# Patient Record
Sex: FEMALE | Race: WHITE | NOT HISPANIC OR LATINO | Employment: UNEMPLOYED | URBAN - METROPOLITAN AREA
[De-identification: names, ages, dates, MRNs, and addresses within clinical notes are randomized per-mention and may not be internally consistent; named-entity substitution may affect disease eponyms.]

---

## 2018-01-16 ENCOUNTER — TELEPHONE (OUTPATIENT)
Dept: NEUROLOGY | Facility: CLINIC | Age: 48
End: 2018-01-16

## 2018-01-16 NOTE — TELEPHONE ENCOUNTER
----- Message from Na Santana sent at 1/16/2018  8:36 AM CST -----  Contact: pt [phone]  Pt would like to reschedule her follow up appointment.

## 2018-02-02 ENCOUNTER — OFFICE VISIT (OUTPATIENT)
Dept: NEUROLOGY | Facility: CLINIC | Age: 48
End: 2018-02-02
Payer: COMMERCIAL

## 2018-02-02 VITALS
HEART RATE: 72 BPM | SYSTOLIC BLOOD PRESSURE: 128 MMHG | WEIGHT: 146 LBS | BODY MASS INDEX: 29.43 KG/M2 | DIASTOLIC BLOOD PRESSURE: 83 MMHG | HEIGHT: 59 IN

## 2018-02-02 DIAGNOSIS — N31.9 NEUROGENIC BLADDER: ICD-10-CM

## 2018-02-02 DIAGNOSIS — R53.82 CHRONIC FATIGUE: ICD-10-CM

## 2018-02-02 DIAGNOSIS — Z71.89 COUNSELING REGARDING GOALS OF CARE: ICD-10-CM

## 2018-02-02 DIAGNOSIS — G35 MULTIPLE SCLEROSIS: Primary | ICD-10-CM

## 2018-02-02 DIAGNOSIS — G47.9 SLEEP DISTURBANCE: ICD-10-CM

## 2018-02-02 DIAGNOSIS — Z87.2 HISTORY OF PSORIATIC ARTHRITIS: ICD-10-CM

## 2018-02-02 PROCEDURE — 99215 OFFICE O/P EST HI 40 MIN: CPT | Mod: S$GLB,,, | Performed by: PHYSICIAN ASSISTANT

## 2018-02-02 PROCEDURE — 3008F BODY MASS INDEX DOCD: CPT | Mod: S$GLB,,, | Performed by: PHYSICIAN ASSISTANT

## 2018-02-02 PROCEDURE — 99999 PR PBB SHADOW E&M-EST. PATIENT-LVL III: CPT | Mod: PBBFAC,,, | Performed by: PHYSICIAN ASSISTANT

## 2018-02-02 RX ORDER — GLATIRAMER 40 MG/ML
40 INJECTION, SOLUTION SUBCUTANEOUS
Qty: 12 SYRINGE | Refills: 5 | Status: SHIPPED | OUTPATIENT
Start: 2018-02-02 | End: 2018-03-27 | Stop reason: ALTCHOICE

## 2018-02-02 RX ORDER — OXYBUTYNIN CHLORIDE 5 MG/1
5 TABLET, EXTENDED RELEASE ORAL DAILY
Qty: 30 TABLET | Refills: 3 | Status: SHIPPED | OUTPATIENT
Start: 2018-02-02 | End: 2018-08-23

## 2018-02-02 NOTE — PATIENT INSTRUCTIONS
Ask cardiologist is OK to take [id number] for MS related fatigue    Melatonin oral solid dosage forms  What is this medicine?  MELATONIN (lucio padilla) is a dietary supplement. It is mostly promoted to help maintain normal sleep patterns. The FDA has not approved this supplement for any medical use.  This supplement may be used for other purposes; ask your health care provider or pharmacist if you have questions.  How should I use this medicine?  Take this supplement by mouth with a glass of water. Do not take with food. This supplement is usually taken 1 or 2 hours before bedtime. After taking this supplement, limit your activities to those needed to prepare for bed. Some products may be chewed or dissolved in the mouth before swallowing. Some tablets or capsules must be swallowed whole; do not cut, crush or chew. Follow the directions on the package labeling, or take as directed by your health care professional. Do not take this supplement more often than directed.  Talk to your pediatrician regarding the use of this supplement in children. Special care may be needed. This supplement is not recommended for use in children without a prescription.  What side effects may I notice from receiving this medicine?  Side effects that you should report to your doctor or health care professional as soon as possible:  · allergic reactions like skin rash, itching or hives, swelling of the face, lips, or tongue  · breathing problems  · change in sex drive or performance  · confusion  · depressed mood, irritable, or other changes in moods or behaviors  · fast, irregular heartbeat  · feeling faint or lightheaded, falls  · irregular or missed menstrual periods  · leakage of milk from the nipples in a person who is not breast-feeding  · signs and symptoms of liver injury like dark yellow or brown urine; general ill feeling or flu-like symptoms; light-colored stools; loss of appetite; nausea; right upper belly pain; unusually  weak or tired; yellowing of the eyes or skin  · sleep-walking  · trouble staying awake or alert during the day  · unusual activities while you are still asleep like driving, eating, making phone calls  · unusual bleeding or bruising  Side effects that usually do not require medical attention (report to your doctor or health care professional if they continue or are bothersome):  · dizziness  · drowsiness  · headache  · tiredness  · unusual dreams or nightmares  · upset stomach  What may interact with this medicine?  Do not take this medicine with any of the following medications:  · fluvoxamine  · ramelteon  · tasimelteon  This medicine may also interact with the following medications:  · alcohol  · atazanavir  · caffeine  · carbamazepine  · certain antibiotics like ciprofloxacin, enoxacin  · certain medicines for depression, anxiety, or psychotic disturbances  · cimetidine  · female hormones, like estrogens and birth control pills, patches, rings, or injections  · methoxsalen  · nifedipine  · other medications for sleep  · other herbal or dietary supplements  · phenobarbital  · rifampin  · smoking tobacco  · tamoxifen  · treatments for cancer, organ transplant, or immune disorders  What if I miss a dose?  If you miss taking your dose at the usual time, skip that dose. If it is almost time for your next dose, take only that dose. Do not take double or extra doses.  Where should I keep my medicine?  Keep out of the reach of children.  Store at room temperature or as directed on the package label. Protect from moisture. Throw away any unused supplement after the expiration date.  What should I tell my health care provider before I take this medicine?  They need to know if you have any of these conditions:  · asthma  · cancer  · depression or mental illness  · diabetes  · hormone problems  · if you often drink alcohol  · immune system problems  · liver disease  · organ transplant  · seizure disorder  · an unusual or  allergic reaction to melatonin, other medicines, foods, dyes, or preservatives  · pregnant or trying to get pregnant  · breast-feeding  What should I watch for while using this medicine?  See your doctor if your symptoms do not get better or if they get worse. Do not take this supplement for more than 2 weeks unless your doctor tells you to.  You may get drowsy or dizzy. Do not drive, use machinery, or do anything that needs mental alertness until you know how this medicine affects you. Do not stand or sit up quickly, especially if you are an older patient. This reduces the risk of dizzy or fainting spells. Alcohol may interfere with the effect of this medicine. Avoid alcoholic drinks.  Talk to your doctor before you use this supplement if you are currently being treated for an emotional, mental, or sleep problem. This medicine may interfere with your treatment.  Herbal or dietary supplements are not regulated like medicines. Rigid  standards are not required for dietary supplements. The purity and strength of these products can vary. The safety and effect of this dietary supplement for a certain disease or illness is not well known. This product is not intended to diagnose, treat, cure or prevent any disease.  The Food and Drug Administration suggests the following to help consumers protect themselves:  · Always read product labels and follow directions.  · Natural does not mean a product is safe for humans to take.  · Look for products that include USP after the ingredient name. This means that the  followed the standards of the [place] Pharmacopoeia.  · Supplements made or sold by a nationally known food or drug company are more likely to be made under tight controls. You can write to the company for more information about how the product was made.  NOTE:This sheet is a summary. It may not cover all possible information. If you have questions about this medicine, talk to your doctor,  pharmacist, or health care provider. Copyright© 2017 [employer]

## 2018-02-02 NOTE — PROGRESS NOTES
"Subjective:       Patient ID: Barbara Burch is a 48 y.o. female who presents today for a routine clinic visit for MS.  She was last seen in September of 2015 by Dr. Ward. She is returning to clinic to re-establish care    MS HPI:  · DMT: not currently on DMT(stopped Tecfidera after last visit with our clinic)  · Side effects from DMT? Yes - 5,000IU/day "when I remember to take it"  · Taking vitamin D3 as recommended? Yes - non consistent   ·  Tecfidera from April of 2013 to Sept 2015  · Copaxone prior but had  injeciton site reaction(bruising, redness, pain)-daily at that time. Did not have auto injector. Patient can't recall if she had training    SOCIAL HISTORY  Social History   Substance Use Topics    Smoking status: Former Smoker     Types: Cigarettes    Smokeless tobacco: Never Used      Comment: quit march 29th 2015    Alcohol use No     Living arrangements - the patient lives with their family.  Employment  Not working--no disability    MS ROS:  · Fatigue: Yes - mild --fatigued by 11 AM  --takes naps most days  · Sleep Disturbance: Yes - "I don't sleep well"; + insomnia; "mind won't shut off' She tried Ambien, but did not find it helpful;   · Bladder Dysfunction: urgency, has not seen Dr. Linton in [place] (urology);   · Bowel Dysfunction: Yes - a chronic issue for her;   · Spasticity: Yes, in calf on R primarily  · Visual Symptoms: No  · Cognitive: Yes - feels she has more ST memory issues;   · Mood Disorder: No; no longer on the prozac; denies any depression;   · Gait Disturbance: states she doesn't have trouble walking during the day, at night without lights she has trouble  · Falls: No  · Hand Dysfunction: No  · Pain: No  · Sexual Dysfunction: She has a lack of sexual desire;   · Skin Breakdown: No  · Tremors: No  · Dysphagia:  No  · Dysarthria:  No  · Heat sensitivity:  Yes,   · Any un-met adaptive needs? No   Copay Assist? N/A      Objective:        1. 25 foot timed walk: 3.8s and 3.9s today " without assist.  In 2015-timed walk 3.9s without assist      Neurologic Exam     Mental Status   Oriented to person, place, and time.   Follows 3 step commands.   Speech: speech is normal   Level of consciousness: alert  Normal comprehension.     Cranial Nerves     CN II   Visual acuity: normal (20/20 OS and 20/25 OD with Snellen hand held chart at 6 ft)    CN III, IV, VI   Pupils are equal, round, and reactive to light.  Extraocular motions are normal.     CN V   Facial sensation intact.     CN VII   Facial expression full, symmetric.     CN VIII   Hearing: intact (finger rub)    CN IX, X   Palate: symmetric    CN XI   CN XI normal.     CN XII   Tongue deviation: none    Motor Exam   Muscle bulk: normal  Overall muscle tone: normal    Strength   Right deltoid: 5/5  Left deltoid: 5/5  Right triceps: 5/5  Left triceps: 5/5  Right wrist extension: 5/5  Left wrist extension: 5/5  Right interossei: 5/5  Left interossei: 5/5  Right iliopsoas: 5/5  Left iliopsoas: 5/5  Right hamstrin/5  Left hamstrin/5  Right anterior tibial: 5/5  Left anterior tibial: 5/5  Right peroneal: 5/5  Left peroneal: 5/5    Sensory Exam   Light touch normal.   Right arm vibration: normal  Left arm vibration: normal  Right leg vibration: decreased from toes  Left leg vibration: decreased from toes    Gait, Coordination, and Reflexes     Gait  Gait: normal    Coordination   Finger to nose coordination: normal  Tandem walking coordination: abnormal    Tremor   Resting tremor: absent  Action tremor: absent    Reflexes   Right brachioradialis: 2+  Left brachioradialis: 2+  Right biceps: 2+  Left biceps: 2+  Right triceps: 2+  Left triceps: 2+  Right patellar: 2+  Left patellar: 2+  Right achilles: 2+  Left achilles: 2+  Right plantar: equivocal  Left plantar: equivocal  Right ankle clonus: absent  Left ankle clonus: absent  Right pendular knee jerk: absent  Left pendular knee jerk: absentNormal Heel/toe walk  Normal RSM             Imaging:      Results for orders placed during the hospital encounter of 12/15/15   MRI Brain W WO Contrast    Impression Numerous focal areas of T2 FLAIR signal hyperintensity within the supratentorial white matter, similar in distribution to prior exam.  No areas of abnormal enhancement. Compared to the prior examination of 12/19/14, the overall number and signal of the white matter lesions is stable.  Overall, these lesions remain nonspecific, noting that distribution of lesions is not typical of MS.  Correlation with patient's symptoms is advised.    ______________________________________     Electronically signed by resident: LUIS ENRIQUEZ MD  Date:     12/15/15  Time:    10:16          As the supervising and teaching physician, I personally reviewed the images and resident's interpretation and I agree with the findings.          Electronically signed by: CRYSTAL ELIAS MD  Date:     12/15/15  Time:    10:28      Results for orders placed during the hospital encounter of 12/16/13   MRI Cervical Spine W WO Cont    Impression  Unremarkable MRI of the cervical and thoracic spine specifically without definite cord signal abnormality to suggest edema.    No abnormal intrathecal enhancement.    Degenerative changes in the cervical spine most pronounced at C5/C6 with posterior disk osteophyte similar prior with mild central canal stenosis with uncovertebral joint hypertrophy and mild neuroforaminal stenosis.          Electronically signed by: HANNAH HUERTAS DO  Date:     12/17/13  Time:    08:30      Results for orders placed during the hospital encounter of 12/16/13   MRI Thoracic Spine W WO Contrast    Impression  Unremarkable MRI of the cervical and thoracic spine specifically without definite cord signal abnormality to suggest edema.    No abnormal intrathecal enhancement.    Degenerative changes in the cervical spine most pronounced at C5/C6 with posterior disk osteophyte similar prior with mild central canal stenosis with  uncovertebral joint hypertrophy and mild neuroforaminal stenosis.          Electronically signed by: HANNAH HUERTAS DO  Date:     12/17/13  Time:    08:30      Patient brought MRI disc of MRI Brain from October 2017([facility]): no comparison done, No enhancing lesions noted upon review. Mild/moderate bilateral white matter lesions    JENNI-October 2017-Normal  SSEP-October 2017-No significant prolongation of latencies noted    Lumbar X-ray--reveals lumbar spondylosis with bilateral  Facet arthropathy at the lower three lumbar levels    Labs:     Lab Results   Component Value Date    FPNYGBKZ39FR 61 06/03/2015    OSPPNCSG31OZ 82 08/06/2014    FXHCXQKU61HI 22 (L) 08/05/2013     No results found for: JCVINDEX, JCVANTIBODY  No results found for: YD0AUZWU, ABSOLUTECD3, AS1CXJBT, ABSOLUTECD8, EJ8EKAZF, ABSOLUTECD4, LABCD48  Lab Results   Component Value Date    WBC 7.20 10/01/2017    RBC 4.43 10/01/2017    HGB 13.6 10/01/2017    HCT 40.4 10/01/2017    MCV 91 10/01/2017    MCH 30.7 10/01/2017    MCHC 33.7 10/01/2017    RDW 12.5 10/01/2017     10/01/2017    MPV 8.6 (L) 10/01/2017    GRAN 4.0 10/01/2017    GRAN 55.3 10/01/2017    LYMPH 2.4 10/01/2017    LYMPH 33.1 10/01/2017    MONO 0.5 10/01/2017    MONO 7.6 10/01/2017    EOS 0.2 10/01/2017    BASO 0.10 10/01/2017    EOSINOPHIL 3.0 10/01/2017    BASOPHIL 1.0 10/01/2017     Sodium   Date Value Ref Range Status   10/01/2017 143 136 - 145 mmol/L Final     Potassium   Date Value Ref Range Status   10/01/2017 3.9 3.5 - 5.1 mmol/L Final     Chloride   Date Value Ref Range Status   10/01/2017 108 95 - 110 mmol/L Final     CO2   Date Value Ref Range Status   10/01/2017 23 23 - 29 mmol/L Final     Glucose   Date Value Ref Range Status   10/01/2017 98 74 - 106 mg/dL Final     BUN, Bld   Date Value Ref Range Status   10/01/2017 11 7 - 17 mg/dL Final     Creatinine   Date Value Ref Range Status   10/01/2017 0.70 0.70 - 1.20 mg/dL Final     Calcium   Date Value  Ref Range Status   10/01/2017 9.6 8.4 - 10.2 mg/dL Final     Total Protein   Date Value Ref Range Status   10/01/2017 7.6 6.3 - 8.2 g/dL Final     Albumin   Date Value Ref Range Status   10/01/2017 4.4 3.5 - 5.2 g/dL Final     Total Bilirubin   Date Value Ref Range Status   10/01/2017 0.9 0.2 - 1.3 mg/dL Final     Alkaline Phosphatase   Date Value Ref Range Status   10/01/2017 89 38 - 145 U/L Final     AST   Date Value Ref Range Status   10/01/2017 31 14 - 36 U/L Final     ALT   Date Value Ref Range Status   10/01/2017 45 (H) 10 - 44 U/L Final     Anion Gap   Date Value Ref Range Status   12/11/2014 10 8 - 16 mmol/L Final     eGFR if    Date Value Ref Range Status   10/01/2017 >60 >60 mL/min/1.73 m^2 Final     eGFR if non    Date Value Ref Range Status   10/01/2017 >60 >60 mL/min/1.73 m^2 Final     Comment:     Calculation used to obtain the estimated glomerular filtration  rate (eGFR) is the CKD-EPI equation. Since race is unknown   in our information system, the eGFR values for   -American and Non--American patients are given   for each creatinine result.       EEG--October 2017-normal    Diagnosis/Assessment/Plan:    1. Multiple Sclerosis  · Assessment: Patients exam is clinically stable; however, she has not been on DMT since our last visit in 2015. She is open to returning to DMT.   · Imaging:Will upload recent disc from 10/2017 into EPIC--no obvious enhancement noted. Once uploaded will compare to priors in EPIC. MRI reviewed with patient today in clinic.  · Disease Modifying Therapies: In light of her apparent mild MS, I would like to return to first line therapy, namely Copaxone. It is not clear from history if patient was having typical injection site reactions. Copaxone is now TIW instead of daily and with use of auto injector and nurse training I am hopeful she will tolerate Copaxone well.      2. MS Symptom Assessment / Management  · Fatigue:  Suggested she  discuss with her cardiologist  trial CoQ10 for her fatigue  · Sleep Disturbance: She will reschedule sleep study in [place] that was previously ordered by another clinic. Suggested trial of Melatonin  · Bladder Dysfunction: Will initiate low dose Ditropan XL for her neurogenic bladder.     Over 50% of this 60 minute visit was spent in direct face to face counseling of the patient about MS, DMT considerations, and MS symptom management.     Follow-up in about 4 months (around 6/2/2018) for follow up with Dr. Ward.  Patient agreed to POC today.    Attending, Dr. Ward, was available during today's encounter. Any change to plan along with cosign to appear in the EMR.     Emily Thompson PA-C  [facility]      Multiple sclerosis  -     oxybutynin (DITROPAN-XL) 5 MG TR24; Take 1 tablet (5 mg total) by mouth once daily.  Dispense: 30 tablet; Refill: 3    Neurogenic bladder  -     oxybutynin (DITROPAN-XL) 5 MG TR24; Take 1 tablet (5 mg total) by mouth once daily.  Dispense: 30 tablet; Refill: 3

## 2018-02-02 NOTE — TELEPHONE ENCOUNTER
----- Message from Emily Thompson PA-C sent at 2/2/2018 10:34 AM CST -----  Please initiate copaxone to [facility]. Please have nurse training done at home.

## 2018-02-05 ENCOUNTER — TELEPHONE (OUTPATIENT)
Dept: PHARMACY | Facility: CLINIC | Age: 48
End: 2018-02-05

## 2018-02-14 NOTE — TELEPHONE ENCOUNTER
DOCUMENTATION ONLY:  Prior authorization for Glatiramer approved from  02/09/18 to 02/09/19    Case Id: [id number]    Co-pay: 0    Patient Assistance IS NOT required. Forwarded to the clinical pharmacist for consult and shipment. MYLA

## 2018-02-15 ENCOUNTER — TELEPHONE (OUTPATIENT)
Dept: PHARMACY | Facility: CLINIC | Age: 48
End: 2018-02-15

## 2018-02-15 NOTE — TELEPHONE ENCOUNTER
Initial Glatiramer 40mg consult completed on 2/15. Glatiramer  will be shipped on 2/15 to arrive at patient's home on  via [employer]. $ 0 copay. Glatiramer start date pending home injection training. Provided [facility]AN Nurse Educator number: [phone]. Address confirmed. Confirmed 2 patient identifiers - name and . Therapy Appropriate. Whisperject form faxed 2/15- 11:30am.     --Injection experience: was on brand copaxone previously before Tecfidera    Counseled patient on administration directions:    Inject Glatiramer into the skin 3 times per week (seperate by 48 hours)   Take out of the refrigerator 30-60 minutes prior to injection.   Wash hands before and after injection.   Monthly RX will come with gauze, Band-Aids, and alcohol swabs.   Patient may inject in either the tops of her thighs or abdomen- but at least 2 inches away from the belly button, upper hips, but always below the waist, or the outer or back part of his/ her upper arm.   Patient is to wipe down the injection site with the alcohol pad, wait to dry.  Pinch about a 2 inch fold of skin between the thumb and index finger, insert the needle at a 90 degree angle straight into the skin.  Hold the syringe steady and slowly push down the plunger, then remove the needle.    Patient will use sharps container; once full, per BRIAN mckenzie, he/she may lock the sharps container and place in the trash. He/ She can then contact the Pharmacy and we will replace the sharps at no additional charge.    Patient was counseled on possible side effects:  · Injection site reaction: redness, soreness, itching, bruising, lipoatrophy, swelling, lumps, pain and rash  · Flushing, chest pain, palpitations, anxiety, dyspnea, constriction of the throat, and urticaria. These symptoms are generally transient and self-limited and do not require specific treatment.   · may occur early or may have their onset several months after the initiation of treatment  · Infection,  weakness, upset stomach, flushing, back pain  · Nausea and vomiting  · ER precautions advised for signs and symptoms of allergic reaction    Patient did not have any further questions. She was advised to keep a calendar to stay compliant. Consultation included: indication; goals of treatment; administration; storage and handling; side effects; how to handle side effects; the importance of compliance; how to handle missed doses; the importance of laboratory monitoring; the importance of keeping all follow up appointments.  Patient understands to report any medication changes to [facility] and provider. All questions answered and addressed to patients satisfaction. I will f/u with her in 1 week from start, [facility] to contact patient in 3 weeks for refills.    Yuli Penaloza, Pharm.D  Specialty Pharmacy Clinical Pharmacist  [facility]  Phone: [phone]

## 2018-03-12 ENCOUNTER — TELEPHONE (OUTPATIENT)
Dept: PHARMACY | Facility: HOSPITAL | Age: 48
End: 2018-03-12

## 2018-03-12 NOTE — TELEPHONE ENCOUNTER
Patient reached for Glatiramer initial followup assesment.     She began her medication on 3/2/18 and will give herself her 5th injection tonight. No missed doses.     She reports that injection site pain fades after ~30 minutes. She has one persistent injection site reaction - a red itchy bump approx size of a 1/2 dollar coin on her left thigh from 5 days ago. It is not hot and is not getting worse according to the patient. Counseled to avoid injection that site until the irritation has resolved and that she may try ice packs to give relief; if still not resolved after a week, gets hot, or starts to spread - recommended informing physician.     Patient also stated thigh injections were more painful than abdomen; asked if it was permissible to use 6mm depth in thighs - confirmed ok by [facility] for lean areas of skin.     Had recent visit to urologist for right back pain in kidney area - reports that she was told there was no stone and it might be MS related - she plans to discuss with neurologist at next visit.     She states that she is tolerating the glatiramer better than the Copaxone this time around and likes the whisperject device.     Marcela Nunes, PharmD  Specialty Pharmacy Clinical Pharmacist  [facility]  P: [phone]

## 2018-03-13 ENCOUNTER — PATIENT MESSAGE (OUTPATIENT)
Dept: NEUROLOGY | Facility: CLINIC | Age: 48
End: 2018-03-13

## 2018-03-13 ENCOUNTER — TELEPHONE (OUTPATIENT)
Dept: PHARMACY | Facility: HOSPITAL | Age: 48
End: 2018-03-13

## 2018-03-13 NOTE — TELEPHONE ENCOUNTER
Call received from Ms. Burch regarding reaction to Monday night's (3/13) glatiramer injection     Per patient, within 5-10 minutes of giving her injection she began to experience red itchy hives all over her body, approximately the size of pencil erasers. She also experienced chest and back pain lasting ~30 minutes. She called the [facility] Nurse line during the acute episode and stayed on the phone with them until the chest pain resolved. She took 2 benadryl and was able to sleep; today she is still itching but the hives have started to resolve.   [Patient plans to respond to [employer] message later this afternoon.]     Counseled patient that this sort of systemic reaction is not unusual and is not necessarily an allergic reaction. She may be able to continue with the medication without experiencing that reaction again in the future. However, we would prefer that the prescriber ok continuation with the therapy. Patient intends to hold next dose until receiving the go-ahead from prescriber.     Marcela Nunes, PharmD  Specialty Pharmacy Clinical Pharmacist  [facility]  P: [phone]

## 2018-03-14 NOTE — TELEPHONE ENCOUNTER
Patient asked to discontinue Copaxone after injection reaction causing all over body whelps, itching, chest and back pain. Will schedule appt to return to clinic to discuss alternative DMT

## 2018-03-27 ENCOUNTER — OFFICE VISIT (OUTPATIENT)
Dept: NEUROLOGY | Facility: CLINIC | Age: 48
End: 2018-03-27
Payer: COMMERCIAL

## 2018-03-27 ENCOUNTER — LAB VISIT (OUTPATIENT)
Dept: LAB | Facility: HOSPITAL | Age: 48
End: 2018-03-27
Payer: COMMERCIAL

## 2018-03-27 ENCOUNTER — PATIENT MESSAGE (OUTPATIENT)
Dept: NEUROLOGY | Facility: CLINIC | Age: 48
End: 2018-03-27

## 2018-03-27 VITALS
SYSTOLIC BLOOD PRESSURE: 131 MMHG | DIASTOLIC BLOOD PRESSURE: 85 MMHG | HEART RATE: 56 BPM | HEIGHT: 59 IN | BODY MASS INDEX: 29.42 KG/M2 | WEIGHT: 145.94 LBS

## 2018-03-27 DIAGNOSIS — R25.2 SPASTICITY: ICD-10-CM

## 2018-03-27 DIAGNOSIS — G35 MULTIPLE SCLEROSIS: Primary | ICD-10-CM

## 2018-03-27 DIAGNOSIS — G35 MULTIPLE SCLEROSIS: ICD-10-CM

## 2018-03-27 DIAGNOSIS — E55.9 VITAMIN D INSUFFICIENCY: ICD-10-CM

## 2018-03-27 DIAGNOSIS — G35 MS (MULTIPLE SCLEROSIS): ICD-10-CM

## 2018-03-27 DIAGNOSIS — M62.838 MUSCLE SPASM: ICD-10-CM

## 2018-03-27 LAB
25(OH)D3+25(OH)D2 SERPL-MCNC: 44 NG/ML
ALBUMIN SERPL BCP-MCNC: 4.3 G/DL
ALP SERPL-CCNC: 95 U/L
ALT SERPL W/O P-5'-P-CCNC: 29 U/L
AST SERPL-CCNC: 17 U/L
BASOPHILS # BLD AUTO: 0.03 K/UL
BASOPHILS NFR BLD: 0.5 %
BILIRUB DIRECT SERPL-MCNC: 0.3 MG/DL
BILIRUB SERPL-MCNC: 1 MG/DL
DIFFERENTIAL METHOD: NORMAL
EOSINOPHIL # BLD AUTO: 0.1 K/UL
EOSINOPHIL NFR BLD: 2.6 %
ERYTHROCYTE [DISTWIDTH] IN BLOOD BY AUTOMATED COUNT: 12.3 %
HCT VFR BLD AUTO: 43.4 %
HGB BLD-MCNC: 14.6 G/DL
IMM GRANULOCYTES # BLD AUTO: 0.01 K/UL
IMM GRANULOCYTES NFR BLD AUTO: 0.2 %
LYMPHOCYTES # BLD AUTO: 2 K/UL
LYMPHOCYTES NFR BLD: 35.9 %
MCH RBC QN AUTO: 30.8 PG
MCHC RBC AUTO-ENTMCNC: 33.6 G/DL
MCV RBC AUTO: 92 FL
MONOCYTES # BLD AUTO: 0.4 K/UL
MONOCYTES NFR BLD: 6.7 %
NEUTROPHILS # BLD AUTO: 3 K/UL
NEUTROPHILS NFR BLD: 54.1 %
NRBC BLD-RTO: 0 /100 WBC
PLATELET # BLD AUTO: 314 K/UL
PMV BLD AUTO: 10.7 FL
PROT SERPL-MCNC: 7.9 G/DL
RBC # BLD AUTO: 4.74 M/UL
WBC # BLD AUTO: 5.49 K/UL

## 2018-03-27 PROCEDURE — 80076 HEPATIC FUNCTION PANEL: CPT

## 2018-03-27 PROCEDURE — 99214 OFFICE O/P EST MOD 30 MIN: CPT | Mod: S$GLB,,, | Performed by: PHYSICIAN ASSISTANT

## 2018-03-27 PROCEDURE — 36415 COLL VENOUS BLD VENIPUNCTURE: CPT

## 2018-03-27 PROCEDURE — 82306 VITAMIN D 25 HYDROXY: CPT

## 2018-03-27 PROCEDURE — 85025 COMPLETE CBC W/AUTO DIFF WBC: CPT

## 2018-03-27 PROCEDURE — 99999 PR PBB SHADOW E&M-EST. PATIENT-LVL III: CPT | Mod: PBBFAC,,, | Performed by: PHYSICIAN ASSISTANT

## 2018-03-27 RX ORDER — BACLOFEN 10 MG/1
TABLET ORAL
Qty: 60 TABLET | Refills: 5 | Status: SHIPPED | OUTPATIENT
Start: 2018-03-27

## 2018-03-27 NOTE — PROGRESS NOTES
Subjective:       Patient ID: Barbara Burch is a 48 y.o. female who presents today for a routine clinic visit for MS to discuss new DMT.      MS HPI:  · DMT: stopped Copaxone  · Side effects from DMT? Yes - whelps, idiosyncratic reaction  · Taking vitamin D3 as recommended? Yes - 5,000IU/d-Nature made   · She is having intermittent spasms in LE's and Right side of back  ·     SOCIAL HISTORY  Social History   Substance Use Topics    Smoking status: Former Smoker     Types: Cigarettes    Smokeless tobacco: Never Used      Comment: quit march 29th 2015    Alcohol use No     Living arrangements - the patient lives with their family.  Employment disability    MS ROS:  · Sleep: kept awake with spasms and restless legs(feels like legs won't stop)--has been on Baclofen in the past. Also feels she may have tried Mirapex in the past as well. She can't recall if this was beneficial.        Objective:          Neurologic Exam      Deferred today    Imaging:     Results for orders placed during the hospital encounter of 12/15/15   MRI Brain W WO Contrast    Impression Numerous focal areas of T2 FLAIR signal hyperintensity within the supratentorial white matter, similar in distribution to prior exam.  No areas of abnormal enhancement. Compared to the prior examination of 12/19/14, the overall number and signal of the white matter lesions is stable.  Overall, these lesions remain nonspecific, noting that distribution of lesions is not typical of MS.  Correlation with patient's symptoms is advised.    ______________________________________     Electronically signed by resident: LUIS ENRIQUEZ MD  Date:     12/15/15  Time:    10:16          As the supervising and teaching physician, I personally reviewed the images and resident's interpretation and I agree with the findings.          Electronically signed by: CRYSTAL ELIAS MD  Date:     12/15/15  Time:    10:28      Results for orders placed during the hospital encounter of  12/16/13   MRI Cervical Spine W WO Cont    Impression  Unremarkable MRI of the cervical and thoracic spine specifically without definite cord signal abnormality to suggest edema.    No abnormal intrathecal enhancement.    Degenerative changes in the cervical spine most pronounced at C5/C6 with posterior disk osteophyte similar prior with mild central canal stenosis with uncovertebral joint hypertrophy and mild neuroforaminal stenosis.          Electronically signed by: HANNAH HUERTAS DO  Date:     12/17/13  Time:    08:30      Results for orders placed during the hospital encounter of 12/16/13   MRI Thoracic Spine W WO Contrast    Impression  Unremarkable MRI of the cervical and thoracic spine specifically without definite cord signal abnormality to suggest edema.    No abnormal intrathecal enhancement.    Degenerative changes in the cervical spine most pronounced at C5/C6 with posterior disk osteophyte similar prior with mild central canal stenosis with uncovertebral joint hypertrophy and mild neuroforaminal stenosis.          Electronically signed by: HANNAH HUERTAS DO  Date:     12/17/13  Time:    08:30          Labs:     Lab Results   Component Value Date    [id number] 44 03/27/2018    [id number] 61 06/03/2015    [id number] 82 08/06/2014     No results found for: JCVINDEX, JCVANTIBODY  No results found for: DZ0KEYCA, ABSOLUTECD3, EZ8LOLCF, ABSOLUTECD8, DJ6CATFA, ABSOLUTECD4, LABCD48  Lab Results   Component Value Date    WBC 5.49 03/27/2018    RBC 4.74 03/27/2018    HGB 14.6 03/27/2018    HCT 43.4 03/27/2018    MCV 92 03/27/2018    MCH 30.8 03/27/2018    MCHC 33.6 03/27/2018    RDW 12.3 03/27/2018     03/27/2018    MPV 10.7 03/27/2018    GRAN 3.0 03/27/2018    GRAN 54.1 03/27/2018    LYMPH 2.0 03/27/2018    LYMPH 35.9 03/27/2018    MONO 0.4 03/27/2018    MONO 6.7 03/27/2018    EOS 0.1 03/27/2018    BASO 0.03 03/27/2018    EOSINOPHIL 2.6 03/27/2018    BASOPHIL 0.5 03/27/2018     Sodium   Date Value Ref  Range Status   10/01/2017 143 136 - 145 mmol/L Final     Potassium   Date Value Ref Range Status   10/01/2017 3.9 3.5 - 5.1 mmol/L Final     Chloride   Date Value Ref Range Status   10/01/2017 108 95 - 110 mmol/L Final     CO2   Date Value Ref Range Status   10/01/2017 23 23 - 29 mmol/L Final     Glucose   Date Value Ref Range Status   10/01/2017 98 74 - 106 mg/dL Final     BUN, Bld   Date Value Ref Range Status   10/01/2017 11 7 - 17 mg/dL Final     Creatinine   Date Value Ref Range Status   10/01/2017 0.70 0.70 - 1.20 mg/dL Final     Calcium   Date Value Ref Range Status   10/01/2017 9.6 8.4 - 10.2 mg/dL Final     Total Protein   Date Value Ref Range Status   03/27/2018 7.9 6.0 - 8.4 g/dL Final     Albumin   Date Value Ref Range Status   03/27/2018 4.3 3.5 - 5.2 g/dL Final     Total Bilirubin   Date Value Ref Range Status   03/27/2018 1.0 0.1 - 1.0 mg/dL Final     Comment:     For infants and newborns, interpretation of results should be based  on gestational age, weight and in agreement with clinical  observations.  Premature Infant recommended reference ranges:  Up to 24 hours.............<8.0 mg/dL  Up to 48 hours............<12.0 mg/dL  3-5 days..................<15.0 mg/dL  6-29 days.................<15.0 mg/dL       Alkaline Phosphatase   Date Value Ref Range Status   03/27/2018 95 55 - 135 U/L Final     AST   Date Value Ref Range Status   03/27/2018 17 10 - 40 U/L Final     ALT   Date Value Ref Range Status   03/27/2018 29 10 - 44 U/L Final     Anion Gap   Date Value Ref Range Status   12/11/2014 10 8 - 16 mmol/L Final     eGFR if    Date Value Ref Range Status   10/01/2017 >60 >60 mL/min/1.73 m^2 Final     eGFR if non    Date Value Ref Range Status   10/01/2017 >60 >60 mL/min/1.73 m^2 Final     Comment:     Calculation used to obtain the estimated glomerular filtration  rate (eGFR) is the CKD-EPI equation. Since race is unknown   in our information system, the eGFR values  for   -American and Non--American patients are given   for each creatinine result.         Diagnosis/Assessment/Plan:    1. Multiple Sclerosis  · Assessment: Patient presents today to discuss alternative DMT after significant reaction to Copaxone(including rash/whelps, chest tightness, etc.)  · Imaging: Will plan for 6 month interval MRI on new DMT.  Recent MRI disc was reviewed from October 2017(done externally) -overall appearance appears stable; however, due to differences in technique(recent MRI has less slices) somewhat difficult to compare.   · Disease Modifying Therapies: It is reasonable to stay with first line therapies and consider interferons. We discussed possible side effect profile of interferons in general(flu-like symptoms, liver toxicity, worsening of depression) and different frequency of dosing(Rebif-TIW, Avonex-once weekly, Plegridy-every other week), and method of administration. In the end, patient chose to move forward with Avonex as she is concerned about SQ injections and injection site reactions. Will check preliminary safety labs today along with Vit D3 and submit for approval to [facility].     2. MS Symptom Assessment / Management  · Spasticity: will restart Baclofen( 1/2-1 tablet in AM and 1 tablet HS).       Over 50% of this 30 minute visit was spent in direct face to face counseling of the patient about MS, DMT considerations, and MS symptom management.     Will keep already existing appt with Dr. Ward in June  Patient agreed to POC today.    Attending, Dr. Ward, was available during today's encounter. Any change to plan along with cosign to appear in the EMR.     Emily Thompson PA-C  [facility]      Multiple sclerosis  -     CBC auto differential; Future; Expected date: 03/27/2018  -     Hepatic function panel; Future  -     Vitamin D; Future    Spasticity    MS (multiple sclerosis)  -     baclofen (LIORESAL) 10 MG tablet; Take 1/2 -1 tablet AM and 1  tablet HS  Dispense: 60 tablet; Refill: 5    Muscle spasm  -     baclofen (LIORESAL) 10 MG tablet; Take 1/2 -1 tablet AM and 1 tablet HS  Dispense: 60 tablet; Refill: 5    Vitamin D insufficiency  -     Vitamin D; Future

## 2018-03-27 NOTE — Clinical Note
We are switching to Avonex after bad reaction to Copaxone. Getting labs done today, once OK --can you please assist in sending to [facility].

## 2018-03-29 NOTE — TELEPHONE ENCOUNTER
Called in the following rx to Jennifer Pharmacist, at [facility]    baclofen (LIORESAL) 10 MG tablet Qty: 60 tablet Refills: 5      Sig: Take 1/2 -1 tablet AM and 1 tablet HS

## 2018-04-03 ENCOUNTER — DOCUMENTATION ONLY (OUTPATIENT)
Dept: NEUROLOGY | Facility: CLINIC | Age: 48
End: 2018-04-03

## 2018-04-03 ENCOUNTER — TELEPHONE (OUTPATIENT)
Dept: PHARMACY | Facility: CLINIC | Age: 48
End: 2018-04-03

## 2018-04-03 ENCOUNTER — TELEPHONE (OUTPATIENT)
Dept: NEUROLOGY | Facility: CLINIC | Age: 48
End: 2018-04-03

## 2018-04-03 DIAGNOSIS — G35 MULTIPLE SCLEROSIS: Primary | ICD-10-CM

## 2018-04-03 NOTE — TELEPHONE ENCOUNTER
----- Message from Emily Thompson PA-C sent at 3/27/2018 11:21 AM CDT -----  We are switching to Avonex after bad reaction to Copaxone. Getting labs done today, once OK --can you please assist in sending to [facility].

## 2018-04-09 ENCOUNTER — PATIENT MESSAGE (OUTPATIENT)
Dept: NEUROLOGY | Facility: CLINIC | Age: 48
End: 2018-04-09

## 2018-04-09 DIAGNOSIS — G25.81 RESTLESS LEG SYNDROME: Primary | ICD-10-CM

## 2018-04-13 RX ORDER — PRAMIPEXOLE DIHYDROCHLORIDE 0.12 MG/1
TABLET ORAL
Qty: 30 TABLET | Refills: 3 | Status: SHIPPED | OUTPATIENT
Start: 2018-04-13 | End: 2018-06-04 | Stop reason: SDUPTHER

## 2018-04-17 NOTE — TELEPHONE ENCOUNTER
DOCUMENTATION ONLY:  Prior authorization for Avonex syringe starter kit  approved from 04.12.2018 to 04.12.2019    Co-pay: $350    Patient Assistance IS required and is being researched.     Prior authorization for Avonex pen maintenance kit  approved from 05.12.2018 to 04.12.2019    Co-pay: $350    Patient Assistance IS required and is being researched.     UTE   Wound Care: Povidone-iodine

## 2018-04-20 ENCOUNTER — TELEPHONE (OUTPATIENT)
Dept: PHARMACY | Facility: HOSPITAL | Age: 48
End: 2018-04-20

## 2018-04-20 NOTE — TELEPHONE ENCOUNTER
Initial Avonex 30mcg Syringes consult completed on 18. Avonex 30mcg Syringes will be shipped on 18 to arrive at patient's home on 18 via [employer]. $0 copay. Patient intends to start Avonex 30mcg Syringes on 18.  Provided Denise Nurse Educator number: [phone]. Address confirmed, CC on file. Confirmed 2 patient identifiers - name and . Therapy Appropriate.    Counseled patient on administration directions. Patient declined to have home nurse injection-training, so I also emailed hyperlink to injection instructions for titration to [email or web address]   First month will be a titration for first 3 weeks. Pt to use AVOSTARTGRIP KIT for appropriate titrated dosing of week 1: 1/4th  dose, week 2: ½ dose. week 3: ¾ dose, week 4: Full dose   Maintenance dose: inject 30mcg into muscle 1 time per week   Take out of the refrigerator 30 minutes prior to injection- allow to come to room temperature   Wash hands with soap and water before and after injection.   Monthly RX will come with gauze, bandaids, and alcohol swabs.   Patient may inject in either the tops or side of thighs or back part of upper arms.   Liquid to be clear without particles or cloudiness.   Patient is to wipe down the injection site with the alcohol pad, wait to dry.  Pinch about a 2 inch fold of skin between the thumb and index finger, insert the needle at a 90 degree angle straight into the skin.  Hold the syringe steady and slowly push down the plunger, then remove the needle.    Patient will use sharps container; once full, per [place] law,  may lock the sharps container and place in the trash. can then contact the Pharmacy and we will replace the sharps at no additional charge.    Patient was counseled on possible side effects:   · Flu-like symptoms: headache, weakness, fever, shakes, aches, pain sweating- may pre-medicate with ibuprofen/naproxen or APAP   · Upset stomach, dizziness, back pain, sleepiness and dry mouth  · ER  precautions advised for signs and symptoms of allergic reaction  · Serious side effects: depression, liver problems, seizures, infection, thyroid problems    Patient verbalized understanding. Patient did not have any further questions. She was advised to keep a calendar to stay compliant; she stated she would set an alarm on her phone. Consultation included: indication; goals of treatment; administration; storage and handling; side effects; how to handle side effects; the importance of compliance; how to handle missed doses; the importance of laboratory monitoring; the importance of keeping all follow up appointments.  Patient understands to report any medication changes to [facility] and provider. All questions answered and addressed to patients satisfaction. Pharmacist will f/u with patient in 1 week from start, [facility] to contact patient in 3 weeks for refills.    Marcela Nunes, PharmD  Specialty Pharmacy Clinical Pharmacist  [facility]  P: [phone]

## 2018-04-24 ENCOUNTER — PATIENT MESSAGE (OUTPATIENT)
Dept: NEUROLOGY | Facility: CLINIC | Age: 48
End: 2018-04-24

## 2018-04-24 DIAGNOSIS — G47.30 SLEEP APNEA, UNSPECIFIED TYPE: ICD-10-CM

## 2018-04-24 DIAGNOSIS — G35 MULTIPLE SCLEROSIS: Primary | ICD-10-CM

## 2018-05-08 ENCOUNTER — PATIENT MESSAGE (OUTPATIENT)
Dept: NEUROLOGY | Facility: CLINIC | Age: 48
End: 2018-05-08

## 2018-05-11 ENCOUNTER — TELEPHONE (OUTPATIENT)
Dept: SLEEP MEDICINE | Facility: OTHER | Age: 48
End: 2018-05-11

## 2018-05-18 ENCOUNTER — TELEPHONE (OUTPATIENT)
Dept: PHARMACY | Facility: CLINIC | Age: 48
End: 2018-05-18

## 2018-05-18 NOTE — TELEPHONE ENCOUNTER
Initial Avonex followup consult completed. Patient confirmed start date of 18. She is feeling optimistic about the Avonex despite somewhat severe flu-like symptoms. Says she remains in bed all day Saturday with muscle and joint pain that is unrelieved by ibuprofen 600mg  (200mg x3) q 4 hours. Counseled to consume lots of fluids and that she can try alternating IBU and APAP q 3 hours, will also forward to MDO. Confirms rotating sites and performing all of her own injections; denies any injection site reactions. Received nurse training on Avonex PENS on 18. Has been having transient headaches during the week that resolve quickly without intervention. In general, reports she is feeling better with more energy  - Friday. Consultation included: indication; goals of treatment; administration; handling; side effects; how to handle side effects; the importance of compliance; the importance of keeping all follow up appointments. No new medications/allergies/medical conditions. No Urgent Care visits. All questions answered and addressed to patients satisfaction. Patient verbalized understanding. Confirmed 2 patient identifiers - name and .     Marcela Nunes, PharmD  Specialty Pharmacy Clinical Pharmacist  [facility]  P: [phone]

## 2018-05-25 ENCOUNTER — PATIENT MESSAGE (OUTPATIENT)
Dept: NEUROLOGY | Facility: CLINIC | Age: 48
End: 2018-05-25

## 2018-05-31 NOTE — TELEPHONE ENCOUNTER
Sleep study results received/reviewed  Performed by Dr. Savage at [facility]  Report indicates HUMZA and nocturnal hypoxemia  Recommendations:  CPAP titration study  Avoid alcohol, sedatives and other CNS depressants that may worsen sleep  Sleep hygiene  Weight management and regular exercise  Will Upload full report into EPIC

## 2018-06-04 ENCOUNTER — OFFICE VISIT (OUTPATIENT)
Dept: NEUROLOGY | Facility: CLINIC | Age: 48
End: 2018-06-04
Payer: COMMERCIAL

## 2018-06-04 ENCOUNTER — LAB VISIT (OUTPATIENT)
Dept: LAB | Facility: HOSPITAL | Age: 48
End: 2018-06-04
Attending: PSYCHIATRY & NEUROLOGY
Payer: COMMERCIAL

## 2018-06-04 VITALS
HEIGHT: 59 IN | HEART RATE: 71 BPM | BODY MASS INDEX: 29.71 KG/M2 | DIASTOLIC BLOOD PRESSURE: 89 MMHG | WEIGHT: 147.38 LBS | SYSTOLIC BLOOD PRESSURE: 139 MMHG

## 2018-06-04 DIAGNOSIS — M62.838 MUSCLE SPASM: ICD-10-CM

## 2018-06-04 DIAGNOSIS — G35 MS (MULTIPLE SCLEROSIS): ICD-10-CM

## 2018-06-04 DIAGNOSIS — G25.81 RESTLESS LEG SYNDROME: ICD-10-CM

## 2018-06-04 DIAGNOSIS — Z71.89 COUNSELING REGARDING GOALS OF CARE: ICD-10-CM

## 2018-06-04 DIAGNOSIS — G35 MS (MULTIPLE SCLEROSIS): Primary | ICD-10-CM

## 2018-06-04 DIAGNOSIS — Z29.89 PROPHYLACTIC IMMUNOTHERAPY: ICD-10-CM

## 2018-06-04 DIAGNOSIS — F34.1 DYSTHYMIA: ICD-10-CM

## 2018-06-04 DIAGNOSIS — G25.81 RESTLESS LEG: ICD-10-CM

## 2018-06-04 DIAGNOSIS — N31.9 NEUROGENIC BLADDER: ICD-10-CM

## 2018-06-04 LAB
ALBUMIN SERPL BCP-MCNC: 4.1 G/DL
ALP SERPL-CCNC: 103 U/L
ALT SERPL W/O P-5'-P-CCNC: 63 U/L
AST SERPL-CCNC: 37 U/L
BASOPHILS # BLD AUTO: 0.03 K/UL
BASOPHILS NFR BLD: 0.4 %
BILIRUB DIRECT SERPL-MCNC: 0.3 MG/DL
BILIRUB SERPL-MCNC: 1 MG/DL
DIFFERENTIAL METHOD: NORMAL
EOSINOPHIL # BLD AUTO: 0.2 K/UL
EOSINOPHIL NFR BLD: 2.4 %
ERYTHROCYTE [DISTWIDTH] IN BLOOD BY AUTOMATED COUNT: 12.1 %
FERRITIN SERPL-MCNC: 285 NG/ML
HCT VFR BLD AUTO: 44.1 %
HGB BLD-MCNC: 14.4 G/DL
IMM GRANULOCYTES # BLD AUTO: 0.02 K/UL
IMM GRANULOCYTES NFR BLD AUTO: 0.3 %
LYMPHOCYTES # BLD AUTO: 2.7 K/UL
LYMPHOCYTES NFR BLD: 39.8 %
MCH RBC QN AUTO: 30.2 PG
MCHC RBC AUTO-ENTMCNC: 32.7 G/DL
MCV RBC AUTO: 93 FL
MONOCYTES # BLD AUTO: 0.6 K/UL
MONOCYTES NFR BLD: 8.1 %
NEUTROPHILS # BLD AUTO: 3.3 K/UL
NEUTROPHILS NFR BLD: 49 %
NRBC BLD-RTO: 0 /100 WBC
PLATELET # BLD AUTO: 323 K/UL
PMV BLD AUTO: 10.5 FL
PROT SERPL-MCNC: 7.8 G/DL
RBC # BLD AUTO: 4.77 M/UL
WBC # BLD AUTO: 6.76 K/UL

## 2018-06-04 PROCEDURE — 80076 HEPATIC FUNCTION PANEL: CPT

## 2018-06-04 PROCEDURE — 99215 OFFICE O/P EST HI 40 MIN: CPT | Mod: S$GLB,,, | Performed by: PSYCHIATRY & NEUROLOGY

## 2018-06-04 PROCEDURE — 85025 COMPLETE CBC W/AUTO DIFF WBC: CPT

## 2018-06-04 PROCEDURE — 36415 COLL VENOUS BLD VENIPUNCTURE: CPT

## 2018-06-04 PROCEDURE — 99999 PR PBB SHADOW E&M-EST. PATIENT-LVL III: CPT | Mod: PBBFAC,,, | Performed by: PSYCHIATRY & NEUROLOGY

## 2018-06-04 PROCEDURE — 82728 ASSAY OF FERRITIN: CPT

## 2018-06-04 RX ORDER — PRAMIPEXOLE DIHYDROCHLORIDE 0.12 MG/1
TABLET ORAL
Qty: 60 TABLET | Refills: 3 | Status: SHIPPED | OUTPATIENT
Start: 2018-06-04 | End: 2018-08-23

## 2018-06-04 RX ORDER — ESCITALOPRAM OXALATE 10 MG/1
10 TABLET ORAL DAILY
Qty: 30 TABLET | Refills: 5 | Status: SHIPPED | OUTPATIENT
Start: 2018-06-04 | End: 2018-08-23

## 2018-06-04 NOTE — PROGRESS NOTES
Subjective:       Patient ID: Barbara Burch is a 48 y.o. female who presents today for a routine clinic visit for MS.      MS HPI:  · DMT: Avonex--newly on this; just had 3rd injection at full dose; moderate flu like sx, but tolerating; taking ibuprofen pm   · Side effects from DMT? Yes -  As above;    · Taking vitamin D3 as recommended? Yes -  5,000 IU/ hs; weekends, 10,000 IU / hs;   · Feeling neurologically stable;   · Getting more headaches, but they short-lived;     SOCIAL HISTORY  Social History   Substance Use Topics    Smoking status: Former Smoker     Types: Cigarettes    Smokeless tobacco: Never Used      Comment: quit march 29th 2015    Alcohol use No     Living arrangements - the patient lives with their family.  Employment: disabled;     MS ROS:  · Fatigue: Yes - significant; takes naps to manage;   · Sleep Disturbance: Yes - has HUMZA; just finished CPAP titration in [place]--awaiting results;   · Bladder Dysfunction: Yes - takes oxybutinin prn  · Bowel Dysfunction: Yes - significant constipation;    · Spasticity: Yes - on prn baclofen;    · Visual Symptoms: yes--age related;   · Cognitive: Yes - struggles both with short-term memory and multitasking;    · Mood Disorder: Yes - more irritable since starting Avonex;  Interested in antidepressant  · Gait Disturbance: Yes - mild balance issues at times;   · Falls: No  · Hand Dysfunction: No  · Pain: Yes - some mild pain in legs at times;   · Sexual Dysfunction: Not Assessed  · Skin Breakdown: No  · Tremors: No  · Dysphagia:  Rarely;   · Dysarthria:  No  · Heat sensitivity:  Yes - very much;    · Any un-met adaptive needs? No  · Copay Assist?  Yes - $0  · Clinical Trial candidate? No      Objective:      25 foot timed walk:  3.8 seconds without assist  Neurologic Exam     MENTAL STATUS: groslly intact  CRANIAL NERVE EXAM: There is no internuclear ophthalmoplegia. Extraocular    muscles are intact. Pupils are equal, round, and reactive to light. No  facial    asymmetry. Facial sensation is intact bilaterally. There is no dysarthria.    MOTOR EXAM:rapid sequential movements are normal; strength is 5/5 in all groups in    the lower extremities and upper extremities.    REFLEXES: 2+ and symmetric throughout in all four extremities;   SENSORY EXAM: She has decreased vibration sense bilaterally in the lower    extremities and upper extremities that is mild    COORDINATION: Normal finger-to-nose exam.    GAIT: Narrow based and stable.      Imaging:     Results for orders placed during the hospital encounter of 12/15/15   MRI Brain W WO Contrast    Impression Numerous focal areas of T2 FLAIR signal hyperintensity within the supratentorial white matter, similar in distribution to prior exam.  No areas of abnormal enhancement. Compared to the prior examination of 12/19/14, the overall number and signal of the white matter lesions is stable.  Overall, these lesions remain nonspecific, noting that distribution of lesions is not typical of MS.  Correlation with patient's symptoms is advised.    ______________________________________     Electronically signed by resident: LUIS ENRIQUEZ MD  Date:     12/15/15  Time:    10:16          As the supervising and teaching physician, I personally reviewed the images and resident's interpretation and I agree with the findings.          Electronically signed by: CRYSTAL ELIAS MD  Date:     12/15/15  Time:    10:28      Results for orders placed during the hospital encounter of 12/16/13   MRI Cervical Spine W WO Cont    Impression  Unremarkable MRI of the cervical and thoracic spine specifically without definite cord signal abnormality to suggest edema.    No abnormal intrathecal enhancement.    Degenerative changes in the cervical spine most pronounced at C5/C6 with posterior disk osteophyte similar prior with mild central canal stenosis with uncovertebral joint hypertrophy and mild neuroforaminal stenosis.          Electronically  signed by: HANNAH HUERTAS DO  Date:     12/17/13  Time:    08:30      Results for orders placed during the hospital encounter of 12/16/13   MRI Thoracic Spine W WO Contrast    Impression  Unremarkable MRI of the cervical and thoracic spine specifically without definite cord signal abnormality to suggest edema.    No abnormal intrathecal enhancement.    Degenerative changes in the cervical spine most pronounced at C5/C6 with posterior disk osteophyte similar prior with mild central canal stenosis with uncovertebral joint hypertrophy and mild neuroforaminal stenosis.          Electronically signed by: HANNAH HUERTAS DO  Date:     12/17/13  Time:    08:30          Labs:     Lab Results   Component Value Date    [id number] 44 03/27/2018    [id number] 61 06/03/2015    [id number] 82 08/06/2014     No results found for: JCVINDEX, JCVANTIBODY  No results found for: DH2NXPDN, ABSOLUTECD3, WJ3DBCAW, ABSOLUTECD8, AL4ORMKE, ABSOLUTECD4, LABCD48  Lab Results   Component Value Date    WBC 5.49 03/27/2018    RBC 4.74 03/27/2018    HGB 14.6 03/27/2018    HCT 43.4 03/27/2018    MCV 92 03/27/2018    MCH 30.8 03/27/2018    MCHC 33.6 03/27/2018    RDW 12.3 03/27/2018     03/27/2018    MPV 10.7 03/27/2018    GRAN 3.0 03/27/2018    GRAN 54.1 03/27/2018    LYMPH 2.0 03/27/2018    LYMPH 35.9 03/27/2018    MONO 0.4 03/27/2018    MONO 6.7 03/27/2018    EOS 0.1 03/27/2018    BASO 0.03 03/27/2018    EOSINOPHIL 2.6 03/27/2018    BASOPHIL 0.5 03/27/2018     Sodium   Date Value Ref Range Status   10/01/2017 143 136 - 145 mmol/L Final     Potassium   Date Value Ref Range Status   10/01/2017 3.9 3.5 - 5.1 mmol/L Final     Chloride   Date Value Ref Range Status   10/01/2017 108 95 - 110 mmol/L Final     CO2   Date Value Ref Range Status   10/01/2017 23 23 - 29 mmol/L Final     Glucose   Date Value Ref Range Status   10/01/2017 98 74 - 106 mg/dL Final     BUN, Bld   Date Value Ref Range Status   10/01/2017 11 7 - 17 mg/dL Final      Creatinine   Date Value Ref Range Status   10/01/2017 0.70 0.70 - 1.20 mg/dL Final     Calcium   Date Value Ref Range Status   10/01/2017 9.6 8.4 - 10.2 mg/dL Final     Total Protein   Date Value Ref Range Status   2018 7.9 6.0 - 8.4 g/dL Final     Albumin   Date Value Ref Range Status   2018 4.3 3.5 - 5.2 g/dL Final     Total Bilirubin   Date Value Ref Range Status   2018 1.0 0.1 - 1.0 mg/dL Final     Comment:     For infants and newborns, interpretation of results should be based  on gestational age, weight and in agreement with clinical  observations.  Premature Infant recommended reference ranges:  Up to 24 hours.............<8.0 mg/dL  Up to 48 hours............<12.0 mg/dL  3-5 days..................<15.0 mg/dL  6-29 days.................<15.0 mg/dL       Alkaline Phosphatase   Date Value Ref Range Status   2018 95 55 - 135 U/L Final     AST   Date Value Ref Range Status   2018 17 10 - 40 U/L Final     ALT   Date Value Ref Range Status   2018 29 10 - 44 U/L Final     Anion Gap   Date Value Ref Range Status   2014 10 8 - 16 mmol/L Final     eGFR if    Date Value Ref Range Status   10/01/2017 >60 >60 mL/min/1.73 m^2 Final     eGFR if non    Date Value Ref Range Status   10/01/2017 >60 >60 mL/min/1.73 m^2 Final     Comment:     Calculation used to obtain the estimated glomerular filtration  rate (eGFR) is the CKD-EPI equation. Since race is unknown   in our information system, the eGFR values for   -American and Non--American patients are given   for each creatinine result.             Diagnosis/Assessment/Plan:    1. Multiple Sclerosis  · Assessment: Pt is clinically stable and tolerating Avonex  · Imagin mo interval MRI planned 2018 [place]  · Disease Modifying Therapies: continue Avonex; labs today and again in 2 mo; advised her to consider taking 2 Aleve's 1 hour prior; continue vit D.     2. MS Symptom  Assessment / Management  · Sleep Disturbance: f/u CPAP titration studay  · Spasticity: continue prn baclofen;        3. RLS--continue Mirapex; side effects discussed; will check baseline ferritin;     F/u 6 mo Emily Thompson PA-C    Over 50% of this 40 minute visit was spent in direct face to face counseling of the patient about MS, DMT considerations, and MS symptom management.         MS (multiple sclerosis)  -     CBC auto differential; Future; Expected date: 06/04/2018  -     Hepatic function panel; Future  -     escitalopram oxalate (LEXAPRO) 10 MG tablet; Take 1 tablet (10 mg total) by mouth once daily.  Dispense: 30 tablet; Refill: 5  -     MRI Brain W WO Contrast; Future; Expected date: 06/04/2018  -     CBC auto differential; Future; Expected date: 06/04/2018  -     Hepatic function panel; Future    Restless leg syndrome  -     pramipexole (MIRAPEX) 0.125 MG tablet; 2 tabs mg once daily 2 hours before bedtime for restless leg prn  Dispense: 60 tablet; Refill: 3  -     Ferritin; Future; Expected date: 06/04/2018    Restless leg    Dysthymia  -     escitalopram oxalate (LEXAPRO) 10 MG tablet; Take 1 tablet (10 mg total) by mouth once daily.  Dispense: 30 tablet; Refill: 5

## 2018-06-04 NOTE — PATIENT INSTRUCTIONS
1. Increase water intake daily  2. Increase dietary fiber   3. Take 1/2 cap of Miralax dissolved in 8oz water 2-3 times /week

## 2018-06-04 NOTE — Clinical Note
Also, just an FYI, that good practice to check iron status when evaluating RLS. Sometimes iron supplementation can make sx better, and fewer side effects that DA agonists;

## 2018-06-04 NOTE — Clinical Note
Shell / [place], this pt worked for [employer] 13 years, and had a both STD and LTD policy with them.  She stopped working in 2013, and filed a claim for her LTD, but was denied by them.  She never appealed, and never got a , and now has no disability benefits;  Her main issues are fatigue and endurance; job was very much on her feet, physical exertion, etc.  Can one of you give her a call to explore this? Not sure if she can appeal the [employer] denial; or maybe just SSDI; thanks

## 2018-06-06 ENCOUNTER — TELEPHONE (OUTPATIENT)
Dept: PSYCHIATRY | Facility: CLINIC | Age: 48
End: 2018-06-06

## 2018-06-06 NOTE — TELEPHONE ENCOUNTER
PERFECTO received a referral from Dr. Ward, requesting assistance with pt regarding disability status.  Phoned pt, today.  She shared she worked for [employer] for 13 years and held other jobs, prior.  She became disabled in October 2013 and initially went out on short-term and then long-term disability through [employer].  She was also assisted to apply for SSDI, but was denied.  At that point, her [employer] benefits were discontinued.  She did not appeal the termination or the SSDI denial.  She has not worked since October 2013.  Based on [employer]SA recent work test guidelines, pt would have needed to work 5 out of the 10 years prior to the quarter in which she became disabled.  She met this criteria, though it's not clear to this writer if she's now waited too long to re-apply.  (SW to inquire about additional guidelines from a staff member at [employer]'s law office).  Instructed pt that she can re-apply for SSDI, online, and agreed to provide instructions via portal message.

## 2018-06-13 ENCOUNTER — PATIENT MESSAGE (OUTPATIENT)
Dept: NEUROLOGY | Facility: CLINIC | Age: 48
End: 2018-06-13

## 2018-06-13 DIAGNOSIS — Z79.899 ENCOUNTER FOR LONG-TERM (CURRENT) USE OF HIGH-RISK MEDICATION: ICD-10-CM

## 2018-06-13 DIAGNOSIS — G35 MULTIPLE SCLEROSIS: Primary | ICD-10-CM

## 2018-06-13 NOTE — TELEPHONE ENCOUNTER
"I spoke with Barbara who said she went to her PCP this morning about her elevated BP. She was started on Losartan 50 mg daily. She said she started Avonex two months ago. Since starting she noticed "every little knick" she gets, takes longer to heal. She also reports she has a "blister" on her lip that is taking a long time to heal.  "

## 2018-06-15 DIAGNOSIS — G35 MULTIPLE SCLEROSIS: ICD-10-CM

## 2018-06-18 ENCOUNTER — TELEPHONE (OUTPATIENT)
Dept: PHARMACY | Facility: CLINIC | Age: 48
End: 2018-06-18

## 2018-06-26 ENCOUNTER — TELEPHONE (OUTPATIENT)
Dept: PSYCHIATRY | Facility: CLINIC | Age: 48
End: 2018-06-26

## 2018-06-26 DIAGNOSIS — G47.33 OSA (OBSTRUCTIVE SLEEP APNEA): Primary | ICD-10-CM

## 2018-06-26 NOTE — TELEPHONE ENCOUNTER
Faxed referral for CPAP supplies to [facility] [phone], phone: [phone].

## 2018-07-02 ENCOUNTER — PATIENT MESSAGE (OUTPATIENT)
Dept: PSYCHIATRY | Facility: CLINIC | Age: 48
End: 2018-07-02

## 2018-07-02 ENCOUNTER — TELEPHONE (OUTPATIENT)
Dept: PSYCHIATRY | Facility: CLINIC | Age: 48
End: 2018-07-02

## 2018-07-02 NOTE — TELEPHONE ENCOUNTER
Spoke with Kadi at [facility] and she advised they do accept pt's insurance but order for CPAP needs to be resent to [facility].  Faxed to [phone], phone: [phone].  Updated pt in portal.

## 2018-07-06 ENCOUNTER — TELEPHONE (OUTPATIENT)
Dept: NEUROLOGY | Facility: CLINIC | Age: 48
End: 2018-07-06

## 2018-07-06 NOTE — TELEPHONE ENCOUNTER
Faxed last office note and sleep study report from 5/21 to [facility] at [phone].

## 2018-07-06 NOTE — TELEPHONE ENCOUNTER
----- Message from Kathy Crawford sent at 7/5/2018  4:19 PM CDT -----  Contact: Fabiana (Wound Care) [phone]  Needs Advice    Reason for call:    Fabiana called to request chart notes and sleep study from 5/21/18    Communication Preference:PHONE     Additional Information: Please fax to [phone]

## 2018-07-11 ENCOUNTER — DOCUMENTATION ONLY (OUTPATIENT)
Dept: NEUROLOGY | Facility: CLINIC | Age: 48
End: 2018-07-11

## 2018-07-11 ENCOUNTER — TELEPHONE (OUTPATIENT)
Dept: PHARMACY | Facility: CLINIC | Age: 48
End: 2018-07-11

## 2018-07-25 ENCOUNTER — DOCUMENTATION ONLY (OUTPATIENT)
Dept: NEUROLOGY | Facility: CLINIC | Age: 48
End: 2018-07-25

## 2018-08-06 ENCOUNTER — TELEPHONE (OUTPATIENT)
Dept: PHARMACY | Facility: CLINIC | Age: 48
End: 2018-08-06

## 2018-08-15 ENCOUNTER — PATIENT MESSAGE (OUTPATIENT)
Dept: NEUROLOGY | Facility: CLINIC | Age: 48
End: 2018-08-15

## 2018-08-24 PROBLEM — R11.10 VOMITING: Status: ACTIVE | Noted: 2018-08-24

## 2018-08-27 ENCOUNTER — TELEPHONE (OUTPATIENT)
Dept: PHARMACY | Facility: CLINIC | Age: 48
End: 2018-08-27

## 2018-08-27 RX ORDER — LOSARTAN POTASSIUM 50 MG/1
50 TABLET ORAL DAILY
Refills: 5 | COMMUNITY
Start: 2018-06-13

## 2018-08-28 ENCOUNTER — PATIENT MESSAGE (OUTPATIENT)
Dept: NEUROLOGY | Facility: CLINIC | Age: 48
End: 2018-08-28

## 2018-08-30 ENCOUNTER — TELEPHONE (OUTPATIENT)
Dept: PHARMACY | Facility: CLINIC | Age: 48
End: 2018-08-30

## 2018-08-30 DIAGNOSIS — G35 MULTIPLE SCLEROSIS: ICD-10-CM

## 2018-09-06 ENCOUNTER — HOSPITAL ENCOUNTER (OUTPATIENT)
Dept: RADIOLOGY | Facility: HOSPITAL | Age: 48
Discharge: HOME | End: 2018-09-06
Attending: PSYCHIATRY & NEUROLOGY
Payer: COMMERCIAL

## 2018-09-06 DIAGNOSIS — G35 MS (MULTIPLE SCLEROSIS): ICD-10-CM

## 2018-09-06 PROCEDURE — 70553 MRI BRAIN STEM W/O & W/DYE: CPT | Mod: 26,,, | Performed by: RADIOLOGY

## 2018-09-06 PROCEDURE — 70553 MRI BRAIN STEM W/O & W/DYE: CPT | Mod: TC

## 2018-09-06 PROCEDURE — A9585 GADOBUTROL INJECTION: HCPCS | Performed by: PSYCHIATRY & NEUROLOGY

## 2018-09-06 PROCEDURE — 25500020 PHARM REV CODE 255: Performed by: PSYCHIATRY & NEUROLOGY

## 2018-09-06 RX ORDER — GADOBUTROL 604.72 MG/ML
6 INJECTION INTRAVENOUS
Status: COMPLETED | OUTPATIENT
Start: 2018-09-06 | End: 2018-09-06

## 2018-09-06 RX ADMIN — GADOBUTROL 6 ML: 604.72 INJECTION INTRAVENOUS at 10:09

## 2018-09-13 ENCOUNTER — OFFICE VISIT (OUTPATIENT)
Dept: NEUROLOGY | Facility: CLINIC | Age: 48
End: 2018-09-13
Payer: COMMERCIAL

## 2018-09-13 ENCOUNTER — LAB VISIT (OUTPATIENT)
Dept: LAB | Facility: HOSPITAL | Age: 48
End: 2018-09-13
Payer: COMMERCIAL

## 2018-09-13 VITALS
BODY MASS INDEX: 30.06 KG/M2 | WEIGHT: 149.13 LBS | SYSTOLIC BLOOD PRESSURE: 132 MMHG | HEIGHT: 59 IN | DIASTOLIC BLOOD PRESSURE: 79 MMHG | HEART RATE: 52 BPM

## 2018-09-13 DIAGNOSIS — R74.8 ELEVATED LIVER ENZYMES: Primary | ICD-10-CM

## 2018-09-13 DIAGNOSIS — G35 MULTIPLE SCLEROSIS: ICD-10-CM

## 2018-09-13 DIAGNOSIS — E55.9 VITAMIN D INSUFFICIENCY: ICD-10-CM

## 2018-09-13 DIAGNOSIS — Z87.2 HISTORY OF PSORIATIC ARTHRITIS: ICD-10-CM

## 2018-09-13 DIAGNOSIS — G47.30 SLEEP APNEA, UNSPECIFIED TYPE: ICD-10-CM

## 2018-09-13 DIAGNOSIS — F34.1 DYSTHYMIA: ICD-10-CM

## 2018-09-13 DIAGNOSIS — G25.81 RESTLESS LEG SYNDROME: ICD-10-CM

## 2018-09-13 DIAGNOSIS — M25.50 GENERALIZED JOINT PAIN: ICD-10-CM

## 2018-09-13 DIAGNOSIS — G35 MULTIPLE SCLEROSIS: Primary | ICD-10-CM

## 2018-09-13 DIAGNOSIS — Z71.89 COUNSELING REGARDING GOALS OF CARE: ICD-10-CM

## 2018-09-13 DIAGNOSIS — Z79.899 ENCOUNTER FOR LONG-TERM (CURRENT) USE OF HIGH-RISK MEDICATION: ICD-10-CM

## 2018-09-13 DIAGNOSIS — N31.9 NEUROGENIC BLADDER: ICD-10-CM

## 2018-09-13 LAB
ALBUMIN SERPL BCP-MCNC: 4.3 G/DL
ALP SERPL-CCNC: 124 U/L
ALT SERPL W/O P-5'-P-CCNC: 75 U/L
ANION GAP SERPL CALC-SCNC: 8 MMOL/L
AST SERPL-CCNC: 44 U/L
BILIRUB SERPL-MCNC: 1.3 MG/DL
BUN SERPL-MCNC: 11 MG/DL
CALCIUM SERPL-MCNC: 10.1 MG/DL
CHLORIDE SERPL-SCNC: 101 MMOL/L
CO2 SERPL-SCNC: 28 MMOL/L
CREAT SERPL-MCNC: 0.8 MG/DL
ERYTHROCYTE [SEDIMENTATION RATE] IN BLOOD BY WESTERGREN METHOD: 19 MM/HR
EST. GFR  (AFRICAN AMERICAN): >60 ML/MIN/1.73 M^2
EST. GFR  (NON AFRICAN AMERICAN): >60 ML/MIN/1.73 M^2
GLUCOSE SERPL-MCNC: 96 MG/DL
POTASSIUM SERPL-SCNC: 4.3 MMOL/L
PROT SERPL-MCNC: 8.1 G/DL
SODIUM SERPL-SCNC: 137 MMOL/L
TSH SERPL DL<=0.005 MIU/L-ACNC: 2.74 UIU/ML

## 2018-09-13 PROCEDURE — 80053 COMPREHEN METABOLIC PANEL: CPT

## 2018-09-13 PROCEDURE — 36415 COLL VENOUS BLD VENIPUNCTURE: CPT

## 2018-09-13 PROCEDURE — 99215 OFFICE O/P EST HI 40 MIN: CPT | Mod: S$GLB,,, | Performed by: PHYSICIAN ASSISTANT

## 2018-09-13 PROCEDURE — 86618 LYME DISEASE ANTIBODY: CPT

## 2018-09-13 PROCEDURE — 86038 ANTINUCLEAR ANTIBODIES: CPT

## 2018-09-13 PROCEDURE — 82164 ANGIOTENSIN I ENZYME TEST: CPT

## 2018-09-13 PROCEDURE — 85652 RBC SED RATE AUTOMATED: CPT

## 2018-09-13 PROCEDURE — 99999 PR PBB SHADOW E&M-EST. PATIENT-LVL III: CPT | Mod: PBBFAC,,, | Performed by: PHYSICIAN ASSISTANT

## 2018-09-13 PROCEDURE — 84443 ASSAY THYROID STIM HORMONE: CPT

## 2018-09-13 NOTE — PROGRESS NOTES
"Subjective:       Patient ID: Barbara Burch is a 48 y.o. female who presents today for a fit in clinic visit for MS.      MS HPI:  · DMT: Avonex  · Side effects from DMT? Yes - flu-like symptoms-taking 2 aleve and benadryl prior to injection and one aleve in AM  · Taking vitamin D3 as recommended? Yes      Stomach pain and nausea--saw GI who stated no signs of ulcers or acid reflux--patient states these symptoms seemed to have resolved-but lasted two weeks   Having joint pain: hips/knees/elbows--since recent illness   Has stopped drinking coffee       SOCIAL HISTORY  Social History     Tobacco Use    Smoking status: Former Smoker     Types: Cigarettes    Smokeless tobacco: Never Used    Tobacco comment: quit march 29th 2015   Substance Use Topics    Alcohol use: No     Alcohol/week: 0.0 oz    Drug use: No     Living arrangements - the patient lives with their family.  Employment disabled    MS ROS:  · Fatigue: Yes - significant; takes naps to manage;   · Sleep Disturbance: Yes - has HUMZA; has CPAP   · Bladder Dysfunction: Yes - stress incontinence with cough/sneeze and states "can't hold it"--stopped ditropan with the above illness and does not want to restart  · Bowel Dysfunction: Yes - significant constipation;    · Spasticity: Yes - on prn baclofen;    · Visual Symptoms: yes--age related;   · Cognitive: Yes - struggles both with short-term memory and multitasking;    · Mood Disorder: Yes - more irritable since starting Avonex;  Lexapro 10mg  · Gait Disturbance: Yes - mild balance issues at times;   · Falls: No  · Hand Dysfunction: No  · Pain: Yes - some mild pain in legs at times;   · Sexual Dysfunction: Not Assessed  · Skin Breakdown: No  · Tremors: No  · Dysphagia:  Rarely;   · Dysarthria:  No  · Heat sensitivity:  Yes - very much;    · Any un-met adaptive needs? No  · Copay Assist?  Yes - $0  · Clinical Trial candidate? No        Objective:        1. 25 foot timed walk:3.78s today, 3.8s last " visit    Neurologic Exam     Mental Status   Oriented to person, place, and time.   Follows 3 step commands.   Speech: speech is normal   Level of consciousness: alert  Normal comprehension.     Cranial Nerves     CN II   Visual acuity: normal (20/20 OS and 20/20 OD with Snellen hand held chart at 6 ft)    CN III, IV, VI   Pupils are equal, round, and reactive to light.  Extraocular motions are normal.     CN V   Facial sensation intact.     CN VII   Facial expression full, symmetric.     CN VIII   Hearing: intact (finger rub)    CN IX, X   Palate: symmetric    CN XI   CN XI normal.     CN XII   Tongue deviation: none    Motor Exam   Muscle bulk: normal  Overall muscle tone: normal    Strength   Right deltoid: 5/5  Left deltoid: 5/5  Right triceps: 5/5  Left triceps: 5/5  Right wrist extension: 5/5  Left wrist extension: 5/5  Right interossei: 5/5  Left interossei: 5/5  Right iliopsoas: 5/5  Left iliopsoas: 5/5  Right hamstrin/5  Left hamstrin/5  Right anterior tibial: 5/5  Left anterior tibial: 5/5  Right peroneal: 5/5  Left peroneal: 5/5    Sensory Exam   Light touch normal.   Right arm vibration: normal  Left arm vibration: normal  Right leg vibration: decreased from toes  Left leg vibration: decreased from toes    Gait, Coordination, and Reflexes     Gait  Gait: normal    Coordination   Finger to nose coordination: normal  Tandem walking coordination: abnormal    Tremor   Resting tremor: absent  Action tremor: absent    Reflexes   Right brachioradialis: 2+  Left brachioradialis: 2+  Right biceps: 2+  Left biceps: 2+  Right triceps: 2+  Left triceps: 2+  Right patellar: 2+  Left patellar: 2+  Right achilles: 2+  Left achilles: 2+  Right plantar: equivocal  Left plantar: equivocal  Right ankle clonus: absent  Left ankle clonus: absent  Right pendular knee jerk: absent  Left pendular knee jerk: absentNormal Heel/toe walk  Normal RSM         Imaging:     Results for orders placed during the hospital  encounter of 09/06/18   MRI Brain W WO Contrast    Impression Numerous foci of T2/FLAIR signal hyperintensity in the supratentorial white matter, similar distribution to the previous examination of 2017.  No areas of abnormal enhancement to suggest active demyelination.  No new lesion is detected.      Electronically signed by: Virginia Kim MD  Date:    09/06/2018  Time:    12:04     Results for orders placed during the hospital encounter of 12/16/13   MRI Cervical Spine W WO Cont    Impression  Unremarkable MRI of the cervical and thoracic spine specifically without definite cord signal abnormality to suggest edema.    No abnormal intrathecal enhancement.    Degenerative changes in the cervical spine most pronounced at C5/C6 with posterior disk osteophyte similar prior with mild central canal stenosis with uncovertebral joint hypertrophy and mild neuroforaminal stenosis.          Electronically signed by: HANNAH HUERTAS DO  Date:     12/17/13  Time:    08:30      Results for orders placed during the hospital encounter of 12/16/13   MRI Thoracic Spine W WO Contrast    Impression  Unremarkable MRI of the cervical and thoracic spine specifically without definite cord signal abnormality to suggest edema.    No abnormal intrathecal enhancement.    Degenerative changes in the cervical spine most pronounced at C5/C6 with posterior disk osteophyte similar prior with mild central canal stenosis with uncovertebral joint hypertrophy and mild neuroforaminal stenosis.          Electronically signed by: HANNAH HUERTAS DO  Date:     12/17/13  Time:    08:30          Labs:     Lab Results   Component Value Date    WZKVFAAU60BL 44 03/27/2018    HWWLLTLA85YR 61 06/03/2015    BHWDUZUO28SV 82 08/06/2014     No results found for: JCVINDEX, JCVANTIBODY  No results found for: OO4IXBUM, ABSOLUTECD3, OH6IYKAG, ABSOLUTECD8, LH9AMNPY, ABSOLUTECD4, LABCD48  Lab Results   Component Value Date    WBC 6.50 08/02/2018    RBC 4.19 08/02/2018    HGB  12.5 08/02/2018    HCT 39.8 08/02/2018    MCV 95 08/02/2018    MCH 29.8 08/02/2018    MCHC 31.4 (L) 08/02/2018    RDW 12.8 08/02/2018     08/02/2018    MPV 10.0 08/02/2018    GRAN 3.1 08/02/2018    GRAN 48.5 08/02/2018    LYMPH 2.6 08/02/2018    LYMPH 40.6 08/02/2018    MONO 0.5 08/02/2018    MONO 7.8 08/02/2018    EOS 0.2 08/02/2018    BASO 0.03 08/02/2018    EOSINOPHIL 2.6 08/02/2018    BASOPHIL 0.5 08/02/2018     Sodium   Date Value Ref Range Status   09/13/2018 137 136 - 145 mmol/L Final     Potassium   Date Value Ref Range Status   09/13/2018 4.3 3.5 - 5.1 mmol/L Final     Chloride   Date Value Ref Range Status   09/13/2018 101 95 - 110 mmol/L Final     CO2   Date Value Ref Range Status   09/13/2018 28 23 - 29 mmol/L Final     Glucose   Date Value Ref Range Status   09/13/2018 96 70 - 110 mg/dL Final     BUN, Bld   Date Value Ref Range Status   09/13/2018 11 6 - 20 mg/dL Final     Creatinine   Date Value Ref Range Status   09/13/2018 0.8 0.5 - 1.4 mg/dL Final     Calcium   Date Value Ref Range Status   09/13/2018 10.1 8.7 - 10.5 mg/dL Final     Total Protein   Date Value Ref Range Status   09/13/2018 8.1 6.0 - 8.4 g/dL Final     Albumin   Date Value Ref Range Status   09/13/2018 4.3 3.5 - 5.2 g/dL Final     Total Bilirubin   Date Value Ref Range Status   09/13/2018 1.3 (H) 0.1 - 1.0 mg/dL Final     Comment:     For infants and newborns, interpretation of results should be based  on gestational age, weight and in agreement with clinical  observations.  Premature Infant recommended reference ranges:  Up to 24 hours.............<8.0 mg/dL  Up to 48 hours............<12.0 mg/dL  3-5 days..................<15.0 mg/dL  6-29 days.................<15.0 mg/dL       Alkaline Phosphatase   Date Value Ref Range Status   09/13/2018 124 55 - 135 U/L Final     AST   Date Value Ref Range Status   09/13/2018 44 (H) 10 - 40 U/L Final     ALT   Date Value Ref Range Status   09/13/2018 75 (H) 10 - 44 U/L Final     Anion Gap    Date Value Ref Range Status   2018 8 8 - 16 mmol/L Final     eGFR if    Date Value Ref Range Status   2018 >60.0 >60 mL/min/1.73 m^2 Final     eGFR if non    Date Value Ref Range Status   2018 >60.0 >60 mL/min/1.73 m^2 Final     Comment:     Calculation used to obtain the estimated glomerular filtration  rate (eGFR) is the CKD-EPI equation.          Diagnosis/Assessment/Plan:    1. Multiple Sclerosis  · Assessment: Her MS appears stable. It appears as though she may have had a viral illness, but I'd like to check some labs and recheck her LFT's.    · Imagin month interval MRI on Avonex-stable as noted above  · Disease Modifying Therapies: Continue Avonex for now; however, if her LFT's are elevated I will have her hold for several weeks and monitor.    2. MS Symptom Assessment / Management  · Sleep Disturbance: CPAP  · Bladder Dysfunction: Gave info on Kegel exercises today  · Mood Disorder: will titrate Lexapro up to 15 mg for the next 2 weeks and if needed can increase to 20mg thereafter      Over 50% of this 40 minute visit was spent in direct face to face counseling of the patient about MS, DMT considerations, and MS symptom management.   Follow-up in about 3 months (around 2018) for follow up with me.  Patient agreed to POC today.    Attending, Dr. Ward, was available during today's encounter. Any change to plan along with cosign to appear in the EMR.     Emily Thompson PA-C  [facility]        Multiple sclerosis  -     LALA; Future; Expected date: 2018  -     Angiotensin converting enzyme; Future; Expected date: 2018  -     TSH; Future; Expected date: 2018  -     Comprehensive metabolic panel; Future  -     Sedimentation rate, manual; Future; Expected date: 2018  -     B. burgdorferi Abs (Lyme Disease); Future; Expected date: 2018    Counseling regarding goals of care    Encounter for long-term (current) use of high-risk  medication    Generalized joint pain  -     LALA; Future; Expected date: 09/13/2018  -     Angiotensin converting enzyme; Future; Expected date: 09/13/2018  -     TSH; Future; Expected date: 09/13/2018  -     Comprehensive metabolic panel; Future  -     Sedimentation rate, manual; Future; Expected date: 09/13/2018  -     B. burgdorferi Abs (Lyme Disease); Future; Expected date: 09/13/2018    Vitamin D insufficiency    Restless leg syndrome    Sleep apnea, unspecified type    Dysthymia    Neurogenic bladder    History of psoriatic arthritis

## 2018-09-13 NOTE — PATIENT INSTRUCTIONS
Increase Lexapro to 15mg for the next 2 weeks, you may then increase to 20mg if needed      Kegel Exercises  Kegel exercises dont need special clothing or equipment. Theyre easy to learn and simple to do. And if you do them right, no one can tell youre doing them, so they can be done almost anywhere. Your healthcare provider, nurse, or physical therapist can answer any questions you have and help you get started.    A weak pelvic floor   The pelvic floor muscles may weaken due to aging, pregnancy and vaginal childbirth, injury, surgery, chronic cough, or lack of exercise. If the pelvic floor is weak, your bladder and other pelvic organs may sag out of place. The urethra may also open too easily and allow urine to leak out. Kegel exercises can help you strengthen your pelvic floor muscles. Then they can better support the pelvic organs and control urine flow.  How Kegel exercises are done  Try each of the Kegel exercises described below. When youre doing them, try not to move your leg, buttock, or stomach muscles.  · Contract as if you were stopping your urine stream. But do it when youre not urinating.  · Tighten your rectum as if trying not to pass gas. Contract your anus, but dont move your buttocks.  · You may place a finger or 2 in the vagina and squeeze your finger with your vagina to learn which muscles to tighten.  Try to hold each Kegel for a slow count to 5. You probably wont be able to hold them for that long at first. But keep practicing. It will get easier as your pelvic floor gets stronger. Eventually, special weights that you place in your vagina may be recommended to help make your Kegels even more effective. Visit your healthcare provider if you have difficulties doing Kegel exercises.  Helpful hints  Here are some tips to follow:  · Do your Kegels as often as you can. The more you do them, the faster youll feel the results.  · Pick an activity you do often as a reminder. For instance, do  your Kegels every time you sit down.  · Tighten your pelvic floor before you sneeze, get up from a chair, cough, laugh, or lift. This protects your pelvic floor from injury and can help prevent urine leakage.   Date Last Reviewed: 8/5/2015  © 5326-0762 [employer]. [address] [place] [address], [place] [address]. All rights reserved. This information is not intended as a substitute for professional medical care. Always follow your healthcare professional's instructions.        Kegel Exercises  Kegel exercises dont need special clothing or equipment. Theyre easy to learn and simple to do. And if you do them right, no one can tell youre doing them, so they can be done almost anywhere. Your healthcare provider, nurse, or physical therapist can answer any questions you have and help you get started.    A weak pelvic floor   The pelvic floor muscles may weaken due to aging, pregnancy and vaginal childbirth, injury, surgery, chronic cough, or lack of exercise. If the pelvic floor is weak, your bladder and other pelvic organs may sag out of place. The urethra may also open too easily and allow urine to leak out. Kegel exercises can help you strengthen your pelvic floor muscles. Then they can better support the pelvic organs and control urine flow.  How Kegel exercises are done  Try each of the Kegel exercises described below. When youre doing them, try not to move your leg, buttock, or stomach muscles.  · Contract as if you were stopping your urine stream. But do it when youre not urinating.  · Tighten your rectum as if trying not to pass gas. Contract your anus, but dont move your buttocks.  · You may place a finger or 2 in the vagina and squeeze your finger with your vagina to learn which muscles to tighten.  Try to hold each Kegel for a slow count to 5. You probably wont be able to hold them for that long at first. But keep practicing. It will get easier as your pelvic floor gets stronger. Eventually,  special weights that you place in your vagina may be recommended to help make your Kegels even more effective. Visit your healthcare provider if you have difficulties doing Kegel exercises.  Helpful hints  Here are some tips to follow:  · Do your Kegels as often as you can. The more you do them, the faster youll feel the results.  · Pick an activity you do often as a reminder. For instance, do your Kegels every time you sit down.  · Tighten your pelvic floor before you sneeze, get up from a chair, cough, laugh, or lift. This protects your pelvic floor from injury and can help prevent urine leakage.   Date Last Reviewed: 8/5/2015  © 3652-2752 [employer]. [address] [place] [address], [place] [address]. All rights reserved. This information is not intended as a substitute for professional medical care. Always follow your healthcare professional's instructions.

## 2018-09-14 LAB
ACE SERPL-CCNC: 29 U/L
ANA SER QL IF: NORMAL
B BURGDOR AB SER IA-ACNC: 0.09 INDEX VALUE

## 2018-09-19 ENCOUNTER — PATIENT MESSAGE (OUTPATIENT)
Dept: NEUROLOGY | Facility: CLINIC | Age: 48
End: 2018-09-19

## 2018-09-25 ENCOUNTER — PATIENT MESSAGE (OUTPATIENT)
Dept: NEUROLOGY | Facility: CLINIC | Age: 48
End: 2018-09-25

## 2018-09-25 DIAGNOSIS — R74.8 ELEVATED LIVER ENZYMES: Primary | ICD-10-CM

## 2018-09-27 ENCOUNTER — TELEPHONE (OUTPATIENT)
Dept: PHARMACY | Facility: CLINIC | Age: 48
End: 2018-09-27

## 2018-10-09 ENCOUNTER — PATIENT MESSAGE (OUTPATIENT)
Dept: NEUROLOGY | Facility: CLINIC | Age: 48
End: 2018-10-09

## 2018-11-20 ENCOUNTER — TELEPHONE (OUTPATIENT)
Dept: PHARMACY | Facility: CLINIC | Age: 48
End: 2018-11-20

## 2018-11-20 NOTE — TELEPHONE ENCOUNTER
Patient confirms Avonex remains on hold.  She will discuss therapy at next appt on 12/13, but she does not think she will restart Avonex.  OSP will follow up after appt.

## 2018-11-26 ENCOUNTER — PATIENT MESSAGE (OUTPATIENT)
Dept: NEUROLOGY | Facility: CLINIC | Age: 48
End: 2018-11-26

## 2018-12-12 ENCOUNTER — PATIENT MESSAGE (OUTPATIENT)
Dept: NEUROLOGY | Facility: CLINIC | Age: 48
End: 2018-12-12

## 2018-12-21 NOTE — TELEPHONE ENCOUNTER
12/20 appt canceled with no followup appt scheduled yet. Last Avonex fill dispensed on 9/5/18. inBasket sent to [facility] staff pool

## 2019-02-06 ENCOUNTER — TELEPHONE (OUTPATIENT)
Dept: PHARMACY | Facility: CLINIC | Age: 49
End: 2019-02-06

## 2019-02-06 NOTE — TELEPHONE ENCOUNTER
Call attempt to inform patient that she should call us once she is able to discuss her therapy with the provider. Will discontinue Avonex activities at this point. INDIAM. Sent MyChart.    inJoseet sent.

## 2020-07-17 DIAGNOSIS — U07.1 COVID-19 VIRUS DETECTED: ICD-10-CM

## 2021-02-05 ENCOUNTER — PATIENT MESSAGE (OUTPATIENT)
Dept: NEUROLOGY | Facility: CLINIC | Age: 51
End: 2021-02-05

## 2021-05-04 ENCOUNTER — PATIENT MESSAGE (OUTPATIENT)
Dept: RESEARCH | Facility: HOSPITAL | Age: 51
End: 2021-05-04

## 2021-05-31 ENCOUNTER — PATIENT MESSAGE (OUTPATIENT)
Dept: PSYCHIATRY | Facility: CLINIC | Age: 51
End: 2021-05-31

## 2021-09-01 ENCOUNTER — PATIENT MESSAGE (OUTPATIENT)
Dept: PSYCHIATRY | Facility: CLINIC | Age: 51
End: 2021-09-01

## 2021-09-02 ENCOUNTER — PATIENT MESSAGE (OUTPATIENT)
Dept: PSYCHIATRY | Facility: CLINIC | Age: 51
End: 2021-09-02

## 2021-12-21 ENCOUNTER — PATIENT MESSAGE (OUTPATIENT)
Dept: NEUROLOGY | Facility: CLINIC | Age: 51
End: 2021-12-21
Payer: COMMERCIAL

## 2022-02-28 ENCOUNTER — PATIENT MESSAGE (OUTPATIENT)
Dept: PSYCHIATRY | Facility: CLINIC | Age: 52
End: 2022-02-28
Payer: COMMERCIAL

## 2022-06-24 ENCOUNTER — PATIENT MESSAGE (OUTPATIENT)
Dept: PSYCHIATRY | Facility: CLINIC | Age: 52
End: 2022-06-24
Payer: COMMERCIAL

## 2022-12-01 ENCOUNTER — PATIENT MESSAGE (OUTPATIENT)
Dept: PSYCHIATRY | Facility: CLINIC | Age: 52
End: 2022-12-01
Payer: COMMERCIAL

## 2023-02-20 ENCOUNTER — PATIENT MESSAGE (OUTPATIENT)
Dept: PSYCHIATRY | Facility: CLINIC | Age: 53
End: 2023-02-20
Payer: COMMERCIAL

## 2023-07-21 ENCOUNTER — PATIENT MESSAGE (OUTPATIENT)
Dept: PSYCHIATRY | Facility: CLINIC | Age: 53
End: 2023-07-21
Payer: COMMERCIAL

## 2024-01-22 ENCOUNTER — PATIENT MESSAGE (OUTPATIENT)
Dept: PSYCHIATRY | Facility: CLINIC | Age: 54
End: 2024-01-22
Payer: COMMERCIAL

## 2024-05-02 ENCOUNTER — PATIENT MESSAGE (OUTPATIENT)
Dept: PSYCHIATRY | Facility: CLINIC | Age: 54
End: 2024-05-02
Payer: COMMERCIAL

## 2024-07-25 ENCOUNTER — PATIENT MESSAGE (OUTPATIENT)
Dept: PSYCHIATRY | Facility: CLINIC | Age: 54
End: 2024-07-25
Payer: COMMERCIAL

## 2024-08-05 ENCOUNTER — PATIENT MESSAGE (OUTPATIENT)
Dept: PSYCHIATRY | Facility: CLINIC | Age: 54
End: 2024-08-05
Payer: COMMERCIAL

## 2024-12-16 ENCOUNTER — PATIENT MESSAGE (OUTPATIENT)
Dept: PSYCHIATRY | Facility: CLINIC | Age: 54
End: 2024-12-16
Payer: COMMERCIAL